# Patient Record
Sex: FEMALE | Race: WHITE | NOT HISPANIC OR LATINO | URBAN - NONMETROPOLITAN AREA
[De-identification: names, ages, dates, MRNs, and addresses within clinical notes are randomized per-mention and may not be internally consistent; named-entity substitution may affect disease eponyms.]

---

## 2017-05-12 ENCOUNTER — APPOINTMENT (RX ONLY)
Dept: URBAN - NONMETROPOLITAN AREA CLINIC 2 | Facility: CLINIC | Age: 82
Setting detail: DERMATOLOGY
End: 2017-05-12

## 2017-05-12 DIAGNOSIS — D69.2 OTHER NONTHROMBOCYTOPENIC PURPURA: ICD-10-CM

## 2017-05-12 DIAGNOSIS — L82.1 OTHER SEBORRHEIC KERATOSIS: ICD-10-CM

## 2017-05-12 DIAGNOSIS — Z85.820 PERSONAL HISTORY OF MALIGNANT MELANOMA OF SKIN: ICD-10-CM

## 2017-05-12 DIAGNOSIS — D18.0 HEMANGIOMA: ICD-10-CM

## 2017-05-12 DIAGNOSIS — L81.4 OTHER MELANIN HYPERPIGMENTATION: ICD-10-CM

## 2017-05-12 DIAGNOSIS — Z85.828 PERSONAL HISTORY OF OTHER MALIGNANT NEOPLASM OF SKIN: ICD-10-CM

## 2017-05-12 PROBLEM — D18.01 HEMANGIOMA OF SKIN AND SUBCUTANEOUS TISSUE: Status: ACTIVE | Noted: 2017-05-12

## 2017-05-12 PROCEDURE — ? COUNSELING

## 2017-05-12 PROCEDURE — ? OBSERVATION

## 2017-05-12 PROCEDURE — 99214 OFFICE O/P EST MOD 30 MIN: CPT

## 2017-05-12 ASSESSMENT — LOCATION DETAILED DESCRIPTION DERM
LOCATION DETAILED: RIGHT LATERAL MALAR CHEEK
LOCATION DETAILED: RIGHT DORSAL FOREARM
LOCATION DETAILED: STERNUM
LOCATION DETAILED: RIGHT SHIN
LOCATION DETAILED: LEFT INFERIOR CENTRAL MALAR CHEEK
LOCATION DETAILED: LEFT DORSAL FOREARM
LOCATION DETAILED: RIGHT DORSAL HAND
LOCATION DETAILED: LEFT SHIN
LOCATION DETAILED: NECK
LOCATION DETAILED: THORACIC SPINE
LOCATION DETAILED: RIGHT SUPERIOR MEDIAL FOREHEAD
LOCATION DETAILED: LEFT CENTRAL MALAR CHEEK
LOCATION DETAILED: LEFT DORSAL HAND

## 2017-05-12 ASSESSMENT — LOCATION SIMPLE DESCRIPTION DERM
LOCATION SIMPLE: RIGHT UPPER EXTREMITY
LOCATION SIMPLE: LEFT LOWER EXTREMITY
LOCATION SIMPLE: RIGHT FOREHEAD
LOCATION SIMPLE: NECK
LOCATION SIMPLE: LEFT HAND
LOCATION SIMPLE: RIGHT HAND
LOCATION SIMPLE: RIGHT MALAR CHEEK
LOCATION SIMPLE: RIGHT LOWER EXTREMITY
LOCATION SIMPLE: CHEST
LOCATION SIMPLE: LEFT UPPER EXTREMITY
LOCATION SIMPLE: BACK
LOCATION SIMPLE: LEFT MALAR CHEEK

## 2017-05-12 ASSESSMENT — LOCATION ZONE DERM
LOCATION ZONE: HAND
LOCATION ZONE: NECK
LOCATION ZONE: FACE
LOCATION ZONE: TRUNK
LOCATION ZONE: LEG
LOCATION ZONE: ARM

## 2017-05-12 NOTE — PROCEDURE: OBSERVATION
Size Of Lesion In Cm (Optional): 0
Detail Level: Detailed
Body Location Override (Optional - Billing Will Still Be Based On Selected Body Map Location If Applicable): Right cheek
Body Location Override (Optional - Billing Will Still Be Based On Selected Body Map Location If Applicable): Mid frontal scalp
Body Location Override (Optional - Billing Will Still Be Based On Selected Body Map Location If Applicable): Left lateral leg
Detail Level: Zone
Detail Level: Simple

## 2017-11-20 ENCOUNTER — APPOINTMENT (RX ONLY)
Dept: URBAN - NONMETROPOLITAN AREA CLINIC 2 | Facility: CLINIC | Age: 82
Setting detail: DERMATOLOGY
End: 2017-11-20

## 2017-11-20 DIAGNOSIS — Z85.820 PERSONAL HISTORY OF MALIGNANT MELANOMA OF SKIN: ICD-10-CM

## 2017-11-20 DIAGNOSIS — L82.0 INFLAMED SEBORRHEIC KERATOSIS: ICD-10-CM

## 2017-11-20 DIAGNOSIS — L82.1 OTHER SEBORRHEIC KERATOSIS: ICD-10-CM

## 2017-11-20 PROBLEM — D48.5 NEOPLASM OF UNCERTAIN BEHAVIOR OF SKIN: Status: ACTIVE | Noted: 2017-11-20

## 2017-11-20 PROCEDURE — ? COUNSELING

## 2017-11-20 PROCEDURE — 17110 DESTRUCTION B9 LES UP TO 14: CPT

## 2017-11-20 PROCEDURE — 99213 OFFICE O/P EST LOW 20 MIN: CPT | Mod: 25

## 2017-11-20 PROCEDURE — 11100: CPT | Mod: 59

## 2017-11-20 PROCEDURE — ? BIOPSY BY SHAVE METHOD

## 2017-11-20 PROCEDURE — ? OBSERVATION

## 2017-11-20 PROCEDURE — ? LIQUID NITROGEN

## 2017-11-20 ASSESSMENT — LOCATION DETAILED DESCRIPTION DERM
LOCATION DETAILED: LEFT MID TEMPLE
LOCATION DETAILED: RIGHT INFERIOR FOREHEAD
LOCATION DETAILED: RIGHT FOREHEAD
LOCATION DETAILED: RIGHT MEDIAL FOREHEAD
LOCATION DETAILED: RIGHT CLAVICULAR NECK
LOCATION DETAILED: LEFT INFERIOR LATERAL FOREHEAD
LOCATION DETAILED: RIGHT MID-UPPER BACK
LOCATION DETAILED: RIGHT INFERIOR CENTRAL MALAR CHEEK
LOCATION DETAILED: RIGHT MEDIAL MALAR CHEEK
LOCATION DETAILED: LEFT MEDIAL FOREHEAD

## 2017-11-20 ASSESSMENT — LOCATION ZONE DERM
LOCATION ZONE: TRUNK
LOCATION ZONE: FACE
LOCATION ZONE: NECK

## 2017-11-20 ASSESSMENT — LOCATION SIMPLE DESCRIPTION DERM
LOCATION SIMPLE: RIGHT ANTERIOR NECK
LOCATION SIMPLE: LEFT FOREHEAD
LOCATION SIMPLE: LEFT TEMPLE
LOCATION SIMPLE: RIGHT UPPER BACK
LOCATION SIMPLE: RIGHT FOREHEAD
LOCATION SIMPLE: RIGHT CHEEK

## 2017-11-20 NOTE — PROCEDURE: OBSERVATION
Body Location Override (Optional - Billing Will Still Be Based On Selected Body Map Location If Applicable): back
Detail Level: Zone
X Size Of Lesion In Cm (Optional): 0
Body Location Override (Optional - Billing Will Still Be Based On Selected Body Map Location If Applicable): right Cheek

## 2017-11-20 NOTE — PROCEDURE: BIOPSY BY SHAVE METHOD
Billing Type: Third-Party Bill
Detail Level: Detailed
Bill [id number] For Specimen Handling/Conveyance To Laboratory?: no
Hemostasis: Aluminum Chloride
Wound Care: Vaseline
Electrodesiccation And Curettage Text: The wound bed was treated with electrodesiccation and curettage after the biopsy was performed.
Biopsy Type: H and E
Curettage Text: The wound bed was treated with curettage after the biopsy was performed.
Type Of Destruction Used: Curettage
Lab: [id number]
X Size Of Lesion In Cm: 0
Dressing: bandage
Silver Nitrate Text: The wound bed was treated with silver nitrate after the biopsy was performed.
Notification Instructions: Patient will be notified of biopsy results. However, patient instructed to call the office if not contacted within 2 weeks. Results will be faxed to PCP when they are available.
Anesthesia Type: 1% lidocaine with 1:100,000 epinephrine and 0.25% Marcaine in a 1:1 solution
Electrodesiccation Text: The wound bed was treated with electrodesiccation after the biopsy was performed.
Consent: Written consent was obtained and risks were reviewed including but not limited to scarring, infection, bleeding, scabbing, incomplete removal, nerve damage and allergy to anesthesia.
Post-Care Instructions: I reviewed with the patient in detail post-care instructions. Patient is to keep the biopsy site dry overnight, and then apply bacitracin twice daily until healed. Patient may apply hydrogen peroxide soaks to remove any crusting.
Lab Facility: [id number]
Anesthesia Volume In Cc: 0.5
Biopsy Method: Double edge Personna blades
Cryotherapy Text: The wound bed was treated with cryotherapy after the biopsy was performed.

## 2017-11-20 NOTE — PROCEDURE: COUNSELING
Detail Level: Generalized
Quality 137: Melanoma: Continuity Of Care - Recall System: Patient information entered into a recall system that includes: target date for the next exam specified AND a process to follow up with patients regarding missed or unscheduled appointments
Detail Level: Detailed
Quality 224: Stage 0-Iic Melanoma: Overutilization Of Imaging Studies For Only Stage 0-Iic Melanoma: None of the following diagnostic imaging studies ordered: chest X-ray, CT, Ultrasound, MRI, PET, or nuclear medicine scans (ML)

## 2018-06-25 ENCOUNTER — APPOINTMENT (RX ONLY)
Dept: URBAN - NONMETROPOLITAN AREA CLINIC 2 | Facility: CLINIC | Age: 83
Setting detail: DERMATOLOGY
End: 2018-06-25

## 2018-06-25 DIAGNOSIS — L82.1 OTHER SEBORRHEIC KERATOSIS: ICD-10-CM

## 2018-06-25 DIAGNOSIS — D22 MELANOCYTIC NEVI: ICD-10-CM

## 2018-06-25 DIAGNOSIS — Z85.828 PERSONAL HISTORY OF OTHER MALIGNANT NEOPLASM OF SKIN: ICD-10-CM

## 2018-06-25 DIAGNOSIS — L82.0 INFLAMED SEBORRHEIC KERATOSIS: ICD-10-CM

## 2018-06-25 DIAGNOSIS — Z85.820 PERSONAL HISTORY OF MALIGNANT MELANOMA OF SKIN: ICD-10-CM

## 2018-06-25 DIAGNOSIS — L81.4 OTHER MELANIN HYPERPIGMENTATION: ICD-10-CM

## 2018-06-25 PROBLEM — D22.62 MELANOCYTIC NEVI OF LEFT UPPER LIMB, INCLUDING SHOULDER: Status: ACTIVE | Noted: 2018-06-25

## 2018-06-25 PROBLEM — D22.61 MELANOCYTIC NEVI OF RIGHT UPPER LIMB, INCLUDING SHOULDER: Status: ACTIVE | Noted: 2018-06-25

## 2018-06-25 PROBLEM — D22.5 MELANOCYTIC NEVI OF TRUNK: Status: ACTIVE | Noted: 2018-06-25

## 2018-06-25 PROCEDURE — 17111 DESTRUCTION B9 LESIONS 15/>: CPT

## 2018-06-25 PROCEDURE — ? OBSERVATION

## 2018-06-25 PROCEDURE — 99214 OFFICE O/P EST MOD 30 MIN: CPT | Mod: 25

## 2018-06-25 PROCEDURE — ? COUNSELING

## 2018-06-25 PROCEDURE — ? LIQUID NITROGEN

## 2018-06-25 ASSESSMENT — LOCATION DETAILED DESCRIPTION DERM
LOCATION DETAILED: RIGHT NASAL SIDEWALL
LOCATION DETAILED: RIGHT SUPERIOR NASAL CHEEK
LOCATION DETAILED: UPPER STERNUM
LOCATION DETAILED: LEFT PROXIMAL POSTERIOR UPPER ARM
LOCATION DETAILED: INFERIOR MID FOREHEAD
LOCATION DETAILED: RIGHT CENTRAL MALAR CHEEK
LOCATION DETAILED: RIGHT MEDIAL FOREHEAD
LOCATION DETAILED: RIGHT DISTAL POSTERIOR UPPER ARM
LOCATION DETAILED: MIDDLE STERNUM
LOCATION DETAILED: LEFT DISTAL CALF
LOCATION DETAILED: RIGHT INFERIOR CENTRAL MALAR CHEEK
LOCATION DETAILED: LEFT CLAVICULAR NECK
LOCATION DETAILED: LEFT MEDIAL FOREHEAD
LOCATION DETAILED: RIGHT VENTRAL DISTAL FOREARM
LOCATION DETAILED: RIGHT PROXIMAL DORSAL FOREARM
LOCATION DETAILED: RIGHT FOREHEAD
LOCATION DETAILED: SUPERIOR THORACIC SPINE
LOCATION DETAILED: LEFT INFERIOR ANTERIOR NECK
LOCATION DETAILED: RIGHT PROXIMAL CALF
LOCATION DETAILED: LEFT INFERIOR MEDIAL MALAR CHEEK
LOCATION DETAILED: LEFT INFERIOR FOREHEAD
LOCATION DETAILED: RIGHT PROXIMAL POSTERIOR UPPER ARM
LOCATION DETAILED: RIGHT PROXIMAL PRETIBIAL REGION
LOCATION DETAILED: LEFT DISTAL POSTERIOR UPPER ARM
LOCATION DETAILED: INFERIOR THORACIC SPINE
LOCATION DETAILED: LEFT PROXIMAL DORSAL FOREARM
LOCATION DETAILED: LEFT DISTAL DORSAL FOREARM
LOCATION DETAILED: RIGHT SUPERIOR LATERAL NECK
LOCATION DETAILED: RIGHT ANTERIOR DISTAL UPPER ARM
LOCATION DETAILED: LEFT PROXIMAL PRETIBIAL REGION
LOCATION DETAILED: LEFT ANTERIOR DISTAL UPPER ARM
LOCATION DETAILED: LEFT INFERIOR MEDIAL FOREHEAD
LOCATION DETAILED: RIGHT CENTRAL TEMPLE

## 2018-06-25 ASSESSMENT — LOCATION ZONE DERM
LOCATION ZONE: NECK
LOCATION ZONE: ARM
LOCATION ZONE: FACE
LOCATION ZONE: TRUNK
LOCATION ZONE: NOSE
LOCATION ZONE: LEG

## 2018-06-25 ASSESSMENT — LOCATION SIMPLE DESCRIPTION DERM
LOCATION SIMPLE: RIGHT PRETIBIAL REGION
LOCATION SIMPLE: NECK
LOCATION SIMPLE: INFERIOR FOREHEAD
LOCATION SIMPLE: RIGHT NOSE
LOCATION SIMPLE: LEFT ANTERIOR NECK
LOCATION SIMPLE: LEFT FOREARM
LOCATION SIMPLE: CHEST
LOCATION SIMPLE: RIGHT UPPER ARM
LOCATION SIMPLE: UPPER BACK
LOCATION SIMPLE: RIGHT FOREHEAD
LOCATION SIMPLE: RIGHT POSTERIOR UPPER ARM
LOCATION SIMPLE: RIGHT TEMPLE
LOCATION SIMPLE: LEFT CALF
LOCATION SIMPLE: LEFT UPPER ARM
LOCATION SIMPLE: RIGHT CALF
LOCATION SIMPLE: LEFT FOREHEAD
LOCATION SIMPLE: RIGHT CHEEK
LOCATION SIMPLE: LEFT PRETIBIAL REGION
LOCATION SIMPLE: LEFT POSTERIOR UPPER ARM
LOCATION SIMPLE: RIGHT FOREARM
LOCATION SIMPLE: LEFT CHEEK

## 2018-06-25 NOTE — PROCEDURE: OBSERVATION
Detail Level: Zone
Size Of Lesion In Cm (Optional): 0
Body Location Override (Optional - Billing Will Still Be Based On Selected Body Map Location If Applicable): right Cheek

## 2018-06-25 NOTE — PROCEDURE: COUNSELING
Detail Level: Detailed
Detail Level: Simple
Detail Level: Zone
Quality 224: Stage 0-Iic Melanoma: Overutilization Of Imaging Studies For Only Stage 0-Iic Melanoma: None of the following diagnostic imaging studies ordered: chest X-ray, CT, Ultrasound, MRI, PET, or nuclear medicine scans (ML)
Quality 137: Melanoma: Continuity Of Care - Recall System: Patient information entered into a recall system that includes: target date for the next exam specified AND a process to follow up with patients regarding missed or unscheduled appointments

## 2019-01-30 ENCOUNTER — APPOINTMENT (RX ONLY)
Dept: URBAN - NONMETROPOLITAN AREA CLINIC 2 | Facility: CLINIC | Age: 84
Setting detail: DERMATOLOGY
End: 2019-01-30

## 2019-01-30 DIAGNOSIS — L82.0 INFLAMED SEBORRHEIC KERATOSIS: ICD-10-CM

## 2019-01-30 DIAGNOSIS — L82.1 OTHER SEBORRHEIC KERATOSIS: ICD-10-CM

## 2019-01-30 DIAGNOSIS — Z85.820 PERSONAL HISTORY OF MALIGNANT MELANOMA OF SKIN: ICD-10-CM

## 2019-01-30 PROBLEM — D48.5 NEOPLASM OF UNCERTAIN BEHAVIOR OF SKIN: Status: ACTIVE | Noted: 2019-01-30

## 2019-01-30 PROCEDURE — ? OBSERVATION

## 2019-01-30 PROCEDURE — ? BIOPSY BY SHAVE METHOD

## 2019-01-30 PROCEDURE — 11102 TANGNTL BX SKIN SINGLE LES: CPT

## 2019-01-30 PROCEDURE — ? COUNSELING

## 2019-01-30 PROCEDURE — 99213 OFFICE O/P EST LOW 20 MIN: CPT | Mod: 25

## 2019-01-30 ASSESSMENT — LOCATION DETAILED DESCRIPTION DERM
LOCATION DETAILED: RIGHT INFERIOR LATERAL MALAR CHEEK
LOCATION DETAILED: RIGHT CENTRAL MALAR CHEEK

## 2019-01-30 ASSESSMENT — LOCATION SIMPLE DESCRIPTION DERM: LOCATION SIMPLE: RIGHT CHEEK

## 2019-01-30 ASSESSMENT — LOCATION ZONE DERM: LOCATION ZONE: FACE

## 2019-01-30 NOTE — PROCEDURE: MIPS QUALITY
Quality 111:Pneumonia Vaccination Status For Older Adults: Pneumococcal Vaccination Previously Received
Quality 110: Preventive Care And Screening: Influenza Immunization: Influenza Immunization Administered during Influenza season
Detail Level: Detailed
Quality 431: Preventive Care And Screening: Unhealthy Alcohol Use - Screening: Patient screened for unhealthy alcohol use using a single question and scores less than 2 times per year

## 2019-01-30 NOTE — PROCEDURE: BIOPSY BY SHAVE METHOD
Post-Care Instructions: I reviewed with the patient in detail post-care instructions. Patient is to keep the biopsy site dry overnight, and then apply vaseline twice daily until healed. Patient may apply hydrogen peroxide soaks to remove any crusting. Wound care handout provided.
Silver Nitrate Text: The wound bed was treated with silver nitrate after the biopsy was performed.
Size Of Lesion In Cm: 0
Depth Of Biopsy: dermis
Dressing: pressure dressing with telfa
Bill For Surgical Tray: no
Hemostasis: Silver Nitrate
Lab: [id number]
Biopsy Type: H and E
Anesthesia Type: 1% lidocaine without epinephrine
Notification Instructions: Patient will be notified of biopsy results. However, patient instructed to call the office if not contacted within 2 weeks. Results will be faxed to PCP when they are available.
Curettage Text: The wound bed was treated with curettage after the biopsy was performed.
Was A Bandage Applied: Yes
Lab Facility: [id number]
Consent: Written consent was obtained and risks were reviewed including but not limited to scarring, infection, bleeding, scabbing, incomplete removal, nerve damage and allergy to anesthesia.
Wound Care: Mupirocin
Cryotherapy Text: The wound bed was treated with cryotherapy after the biopsy was performed.
Biopsy Method: Personna blade
Anesthesia Volume In Cc: 2
Type Of Destruction Used: Curettage
Billing Type: Third-Party Bill
Electrodesiccation And Curettage Text: The wound bed was treated with electrodesiccation and curettage after the biopsy was performed.
Detail Level: Detailed
Electrodesiccation Text: The wound bed was treated with electrodesiccation after the biopsy was performed.

## 2019-07-24 ENCOUNTER — APPOINTMENT (RX ONLY)
Dept: URBAN - NONMETROPOLITAN AREA CLINIC 2 | Facility: CLINIC | Age: 84
Setting detail: DERMATOLOGY
End: 2019-07-24

## 2019-07-24 DIAGNOSIS — L81.4 OTHER MELANIN HYPERPIGMENTATION: ICD-10-CM

## 2019-07-24 DIAGNOSIS — D17 BENIGN LIPOMATOUS NEOPLASM: ICD-10-CM

## 2019-07-24 DIAGNOSIS — L82.1 OTHER SEBORRHEIC KERATOSIS: ICD-10-CM

## 2019-07-24 DIAGNOSIS — D22 MELANOCYTIC NEVI: ICD-10-CM

## 2019-07-24 PROBLEM — D17.24 BENIGN LIPOMATOUS NEOPLASM OF SKIN AND SUBCUTANEOUS TISSUE OF LEFT LEG: Status: ACTIVE | Noted: 2019-07-24

## 2019-07-24 PROBLEM — D22.5 MELANOCYTIC NEVI OF TRUNK: Status: ACTIVE | Noted: 2019-07-24

## 2019-07-24 PROCEDURE — 99213 OFFICE O/P EST LOW 20 MIN: CPT

## 2019-07-24 PROCEDURE — ? OBSERVATION

## 2019-07-24 PROCEDURE — ? COUNSELING

## 2019-07-24 ASSESSMENT — LOCATION DETAILED DESCRIPTION DERM
LOCATION DETAILED: LEFT CENTRAL BUCCAL CHEEK
LOCATION DETAILED: LEFT INFERIOR MEDIAL MALAR CHEEK
LOCATION DETAILED: LEFT SUPERIOR UPPER BACK
LOCATION DETAILED: RIGHT INFERIOR CENTRAL MALAR CHEEK
LOCATION DETAILED: RIGHT SUPERIOR UPPER BACK
LOCATION DETAILED: LEFT ANTERIOR DISTAL THIGH
LOCATION DETAILED: RIGHT DISTAL DORSAL FOREARM
LOCATION DETAILED: LEFT PROXIMAL DORSAL FOREARM

## 2019-07-24 ASSESSMENT — LOCATION ZONE DERM
LOCATION ZONE: TRUNK
LOCATION ZONE: LEG
LOCATION ZONE: ARM
LOCATION ZONE: FACE

## 2019-07-24 ASSESSMENT — LOCATION SIMPLE DESCRIPTION DERM
LOCATION SIMPLE: LEFT UPPER BACK
LOCATION SIMPLE: LEFT THIGH
LOCATION SIMPLE: LEFT FOREARM
LOCATION SIMPLE: RIGHT FOREARM
LOCATION SIMPLE: LEFT CHEEK
LOCATION SIMPLE: RIGHT UPPER BACK
LOCATION SIMPLE: RIGHT CHEEK

## 2019-07-24 NOTE — HPI: SKIN LESION
How Severe Is Your Skin Lesion?: moderate
Has Your Skin Lesion Been Treated?: not been treated
Is This A New Presentation, Or A Follow-Up?: Skin Lesion
Is This A New Presentation, Or A Follow-Up?: Growths

## 2019-07-24 NOTE — PROCEDURE: REASSURANCE
Hide Include Location In Plan Question?: No
Include Location In Plan?: Yes
Detail Level: Detailed
Additional Note: Bx proven ISK with Dr Ross from [facility] 01/2019\\npatient states itchy at time but deferred treatment\\nReassured benign
Detail Level: Simple

## 2020-07-27 ENCOUNTER — APPOINTMENT (RX ONLY)
Dept: URBAN - NONMETROPOLITAN AREA CLINIC 2 | Facility: CLINIC | Age: 85
Setting detail: DERMATOLOGY
End: 2020-07-27

## 2020-07-27 DIAGNOSIS — D69.2 OTHER NONTHROMBOCYTOPENIC PURPURA: ICD-10-CM

## 2020-07-27 DIAGNOSIS — S31000A OPEN WOUND(S) (MULTIPLE) OF UNSPECIFIED SITE(S), WITHOUT MENTION OF COMPLICATION: ICD-10-CM

## 2020-07-27 DIAGNOSIS — L81.4 OTHER MELANIN HYPERPIGMENTATION: ICD-10-CM

## 2020-07-27 DIAGNOSIS — Z85.828 PERSONAL HISTORY OF OTHER MALIGNANT NEOPLASM OF SKIN: ICD-10-CM

## 2020-07-27 DIAGNOSIS — Z85.820 PERSONAL HISTORY OF MALIGNANT MELANOMA OF SKIN: ICD-10-CM

## 2020-07-27 DIAGNOSIS — L82.1 OTHER SEBORRHEIC KERATOSIS: ICD-10-CM

## 2020-07-27 DIAGNOSIS — L57.0 ACTINIC KERATOSIS: ICD-10-CM

## 2020-07-27 PROBLEM — S51.801A UNSPECIFIED OPEN WOUND OF RIGHT FOREARM, INITIAL ENCOUNTER: Status: ACTIVE | Noted: 2020-07-27

## 2020-07-27 PROCEDURE — 17000 DESTRUCT PREMALG LESION: CPT

## 2020-07-27 PROCEDURE — ? DRESSING CHANGE

## 2020-07-27 PROCEDURE — 17003 DESTRUCT PREMALG LES 2-14: CPT

## 2020-07-27 PROCEDURE — ? COUNSELING

## 2020-07-27 PROCEDURE — 99213 OFFICE O/P EST LOW 20 MIN: CPT | Mod: 25

## 2020-07-27 PROCEDURE — ? LIQUID NITROGEN

## 2020-07-27 PROCEDURE — ? OBSERVATION

## 2020-07-27 ASSESSMENT — LOCATION DETAILED DESCRIPTION DERM
LOCATION DETAILED: RIGHT CENTRAL MALAR CHEEK
LOCATION DETAILED: LEFT DISTAL CALF
LOCATION DETAILED: RIGHT DISTAL CALF
LOCATION DETAILED: RIGHT PROXIMAL PRETIBIAL REGION
LOCATION DETAILED: RIGHT ANTERIOR PROXIMAL UPPER ARM
LOCATION DETAILED: LEFT INFERIOR MEDIAL MALAR CHEEK
LOCATION DETAILED: RIGHT NASAL SIDEWALL
LOCATION DETAILED: LEFT DISTAL LATERAL CALF
LOCATION DETAILED: RIGHT RADIAL DORSAL HAND
LOCATION DETAILED: LEFT CHIN
LOCATION DETAILED: RIGHT INFERIOR MEDIAL MALAR CHEEK
LOCATION DETAILED: LEFT DISTAL PRETIBIAL REGION
LOCATION DETAILED: LEFT PROXIMAL PRETIBIAL REGION
LOCATION DETAILED: LEFT RADIAL DORSAL HAND
LOCATION DETAILED: RIGHT MEDIAL MALAR CHEEK
LOCATION DETAILED: RIGHT PROXIMAL POSTERIOR UPPER ARM
LOCATION DETAILED: LEFT INFERIOR ANTERIOR NECK
LOCATION DETAILED: LEFT VENTRAL DISTAL FOREARM
LOCATION DETAILED: MID-FRONTAL SCALP
LOCATION DETAILED: UPPER STERNUM
LOCATION DETAILED: LEFT ANTERIOR PROXIMAL UPPER ARM
LOCATION DETAILED: RIGHT ULNAR DORSAL HAND
LOCATION DETAILED: RIGHT VENTRAL PROXIMAL FOREARM
LOCATION DETAILED: LEFT PROXIMAL POSTERIOR UPPER ARM
LOCATION DETAILED: RIGHT VENTRAL DISTAL FOREARM
LOCATION DETAILED: LEFT DISTAL DORSAL FOREARM
LOCATION DETAILED: MID POSTERIOR NECK
LOCATION DETAILED: RIGHT DISTAL PRETIBIAL REGION
LOCATION DETAILED: INFERIOR MID FOREHEAD
LOCATION DETAILED: LEFT PROXIMAL DORSAL FOREARM
LOCATION DETAILED: RIGHT PROXIMAL DORSAL FOREARM

## 2020-07-27 ASSESSMENT — LOCATION ZONE DERM
LOCATION ZONE: HAND
LOCATION ZONE: NOSE
LOCATION ZONE: FACE
LOCATION ZONE: SCALP
LOCATION ZONE: TRUNK
LOCATION ZONE: ARM
LOCATION ZONE: LEG
LOCATION ZONE: NECK

## 2020-07-27 ASSESSMENT — LOCATION SIMPLE DESCRIPTION DERM
LOCATION SIMPLE: LEFT HAND
LOCATION SIMPLE: INFERIOR FOREHEAD
LOCATION SIMPLE: RIGHT CHEEK
LOCATION SIMPLE: LEFT CHEEK
LOCATION SIMPLE: LEFT CALF
LOCATION SIMPLE: LEFT FOREARM
LOCATION SIMPLE: RIGHT PRETIBIAL REGION
LOCATION SIMPLE: RIGHT POSTERIOR UPPER ARM
LOCATION SIMPLE: LEFT POSTERIOR UPPER ARM
LOCATION SIMPLE: LEFT UPPER ARM
LOCATION SIMPLE: RIGHT CALF
LOCATION SIMPLE: CHIN
LOCATION SIMPLE: POSTERIOR NECK
LOCATION SIMPLE: RIGHT UPPER ARM
LOCATION SIMPLE: CHEST
LOCATION SIMPLE: LEFT PRETIBIAL REGION
LOCATION SIMPLE: RIGHT FOREARM
LOCATION SIMPLE: RIGHT NOSE
LOCATION SIMPLE: LEFT ANTERIOR NECK
LOCATION SIMPLE: ANTERIOR SCALP
LOCATION SIMPLE: RIGHT HAND

## 2020-07-27 NOTE — PROCEDURE: OBSERVATION
Detail Level: Simple
Size Of Lesion In Cm (Optional): 0
Body Location Override (Optional - Billing Will Still Be Based On Selected Body Map Location If Applicable): left lateral scalp
Detail Level: Zone
Detail Level: Detailed

## 2020-07-27 NOTE — PROCEDURE: COUNSELING
When Should The Patient Follow-Up For Their Next Full-Body Skin Exam?: 1 Year
Detail Level: Detailed
Quality 137: Melanoma: Continuity Of Care - Recall System: Patient information entered into a recall system that includes: target date for the next exam specified AND a process to follow up with patients regarding missed or unscheduled appointments
Detail Level: Zone
Detail Level: Simple

## 2021-07-27 ENCOUNTER — APPOINTMENT (RX ONLY)
Dept: URBAN - NONMETROPOLITAN AREA CLINIC 2 | Facility: CLINIC | Age: 86
Setting detail: DERMATOLOGY
End: 2021-07-27

## 2021-07-27 DIAGNOSIS — L82.0 INFLAMED SEBORRHEIC KERATOSIS: ICD-10-CM

## 2021-07-27 DIAGNOSIS — D69.2 OTHER NONTHROMBOCYTOPENIC PURPURA: ICD-10-CM

## 2021-07-27 DIAGNOSIS — L82.1 OTHER SEBORRHEIC KERATOSIS: ICD-10-CM

## 2021-07-27 DIAGNOSIS — L81.4 OTHER MELANIN HYPERPIGMENTATION: ICD-10-CM

## 2021-07-27 DIAGNOSIS — Z85.828 PERSONAL HISTORY OF OTHER MALIGNANT NEOPLASM OF SKIN: ICD-10-CM

## 2021-07-27 DIAGNOSIS — Z85.820 PERSONAL HISTORY OF MALIGNANT MELANOMA OF SKIN: ICD-10-CM

## 2021-07-27 DIAGNOSIS — L57.0 ACTINIC KERATOSIS: ICD-10-CM

## 2021-07-27 PROCEDURE — ? LIQUID NITROGEN

## 2021-07-27 PROCEDURE — 17110 DESTRUCTION B9 LES UP TO 14: CPT

## 2021-07-27 PROCEDURE — ? OBSERVATION

## 2021-07-27 PROCEDURE — 99213 OFFICE O/P EST LOW 20 MIN: CPT | Mod: 25

## 2021-07-27 PROCEDURE — 17003 DESTRUCT PREMALG LES 2-14: CPT | Mod: 59

## 2021-07-27 PROCEDURE — ? COUNSELING

## 2021-07-27 PROCEDURE — 17000 DESTRUCT PREMALG LESION: CPT | Mod: 59

## 2021-07-27 ASSESSMENT — LOCATION SIMPLE DESCRIPTION DERM
LOCATION SIMPLE: LEFT PRETIBIAL REGION
LOCATION SIMPLE: ANTERIOR SCALP
LOCATION SIMPLE: RIGHT CHEEK
LOCATION SIMPLE: LEFT FOREARM
LOCATION SIMPLE: LOWER BACK
LOCATION SIMPLE: RIGHT POSTERIOR UPPER ARM
LOCATION SIMPLE: INFERIOR FOREHEAD
LOCATION SIMPLE: RIGHT HAND
LOCATION SIMPLE: LEFT POSTERIOR UPPER ARM
LOCATION SIMPLE: RIGHT FOREARM
LOCATION SIMPLE: LEFT CHEEK
LOCATION SIMPLE: POSTERIOR NECK
LOCATION SIMPLE: RIGHT UPPER ARM
LOCATION SIMPLE: RIGHT CALF
LOCATION SIMPLE: CHIN
LOCATION SIMPLE: CHEST
LOCATION SIMPLE: LEFT UPPER BACK
LOCATION SIMPLE: RIGHT UPPER BACK
LOCATION SIMPLE: RIGHT PRETIBIAL REGION
LOCATION SIMPLE: LEFT CALF
LOCATION SIMPLE: LEFT HAND
LOCATION SIMPLE: LEFT UPPER ARM
LOCATION SIMPLE: LEFT ANTERIOR NECK

## 2021-07-27 ASSESSMENT — LOCATION DETAILED DESCRIPTION DERM
LOCATION DETAILED: LEFT CHIN
LOCATION DETAILED: RIGHT PROXIMAL POSTERIOR UPPER ARM
LOCATION DETAILED: LEFT PROXIMAL PRETIBIAL REGION
LOCATION DETAILED: RIGHT VENTRAL DISTAL FOREARM
LOCATION DETAILED: LEFT INFERIOR MEDIAL MALAR CHEEK
LOCATION DETAILED: LEFT PROXIMAL DORSAL FOREARM
LOCATION DETAILED: RIGHT INFERIOR UPPER BACK
LOCATION DETAILED: INFERIOR MID FOREHEAD
LOCATION DETAILED: LEFT ANTERIOR PROXIMAL UPPER ARM
LOCATION DETAILED: RIGHT RADIAL DORSAL HAND
LOCATION DETAILED: RIGHT PROXIMAL DORSAL FOREARM
LOCATION DETAILED: UPPER STERNUM
LOCATION DETAILED: LEFT MEDIAL SUPERIOR CHEST
LOCATION DETAILED: LEFT RADIAL DORSAL HAND
LOCATION DETAILED: RIGHT PROXIMAL PRETIBIAL REGION
LOCATION DETAILED: RIGHT CENTRAL MALAR CHEEK
LOCATION DETAILED: RIGHT DISTAL DORSAL FOREARM
LOCATION DETAILED: RIGHT VENTRAL PROXIMAL FOREARM
LOCATION DETAILED: LEFT INFERIOR UPPER BACK
LOCATION DETAILED: RIGHT INFERIOR MEDIAL MALAR CHEEK
LOCATION DETAILED: RIGHT DISTAL CALF
LOCATION DETAILED: MID POSTERIOR NECK
LOCATION DETAILED: LEFT VENTRAL DISTAL FOREARM
LOCATION DETAILED: RIGHT ULNAR DORSAL HAND
LOCATION DETAILED: SUPERIOR LUMBAR SPINE
LOCATION DETAILED: LEFT PROXIMAL POSTERIOR UPPER ARM
LOCATION DETAILED: LEFT DISTAL LATERAL CALF
LOCATION DETAILED: RIGHT ANTERIOR PROXIMAL UPPER ARM
LOCATION DETAILED: LEFT DISTAL CALF
LOCATION DETAILED: LEFT DISTAL DORSAL FOREARM
LOCATION DETAILED: LEFT INFERIOR ANTERIOR NECK
LOCATION DETAILED: MID-FRONTAL SCALP

## 2021-07-27 ASSESSMENT — LOCATION ZONE DERM
LOCATION ZONE: LEG
LOCATION ZONE: HAND
LOCATION ZONE: TRUNK
LOCATION ZONE: NECK
LOCATION ZONE: FACE
LOCATION ZONE: ARM
LOCATION ZONE: SCALP

## 2021-07-27 NOTE — PROCEDURE: LIQUID NITROGEN
Render Post-Care Instructions In Note?: no
Post-Care Instructions: I reviewed with the patient in detail post-care instructions. Patient is to wear sunprotection, and avoid picking at any of the treated lesions. Pt may apply Vaseline to crusted or scabbing areas.
Medical Necessity Information: It is in your best interest to select a reason for this procedure from the list below. All of these items fulfill various CMS LCD requirements except the new and changing color options.
Show Aperture Variable?: Yes
Detail Level: Detailed
Consent: The patient's consent was obtained including but not limited to risks of crusting, scabbing, blistering, scarring, darker or lighter pigmentary change, recurrence, incomplete removal and infection.
Medical Necessity Clause: This procedure was medically necessary because the lesions that were treated were:
Duration Of Freeze Thaw-Cycle (Seconds): 5
Number Of Freeze-Thaw Cycles: 1 freeze-thaw cycle
Detail Level: Zone